# Patient Record
Sex: FEMALE | Race: WHITE | URBAN - METROPOLITAN AREA
[De-identification: names, ages, dates, MRNs, and addresses within clinical notes are randomized per-mention and may not be internally consistent; named-entity substitution may affect disease eponyms.]

---

## 2021-02-26 ENCOUNTER — IMMUNIZATION (OUTPATIENT)
Dept: PRIMARY CARE CLINIC | Age: 66
End: 2021-02-26
Payer: COMMERCIAL

## 2021-02-26 PROCEDURE — 0001A COVID-19, PFIZER VACCINE 30MCG/0.3ML DOSE: CPT | Performed by: FAMILY MEDICINE

## 2021-02-26 PROCEDURE — 91300 COVID-19, PFIZER VACCINE 30MCG/0.3ML DOSE: CPT | Performed by: FAMILY MEDICINE

## 2021-03-19 ENCOUNTER — IMMUNIZATION (OUTPATIENT)
Dept: PRIMARY CARE CLINIC | Age: 66
End: 2021-03-19
Payer: COMMERCIAL

## 2021-03-19 PROCEDURE — 91300 COVID-19, PFIZER VACCINE 30MCG/0.3ML DOSE: CPT | Performed by: FAMILY MEDICINE

## 2021-03-19 PROCEDURE — 0002A PR IMM ADMN SARSCOV2 30MCG/0.3ML DIL RECON 2ND DOSE: CPT | Performed by: FAMILY MEDICINE

## 2022-12-29 RX ORDER — AMLODIPINE BESYLATE 2.5 MG/1
2.5 TABLET ORAL DAILY
COMMUNITY
Start: 2022-10-06

## 2022-12-29 RX ORDER — OMEPRAZOLE 20 MG/1
20 CAPSULE, DELAYED RELEASE ORAL DAILY
COMMUNITY
Start: 2022-10-06

## 2022-12-29 RX ORDER — LISINOPRIL AND HYDROCHLOROTHIAZIDE 20; 12.5 MG/1; MG/1
2 TABLET ORAL DAILY
COMMUNITY
Start: 2022-10-06

## 2022-12-29 RX ORDER — METFORMIN HYDROCHLORIDE 500 MG/1
2000 TABLET, EXTENDED RELEASE ORAL
COMMUNITY
Start: 2022-11-21

## 2022-12-29 RX ORDER — ATORVASTATIN CALCIUM 20 MG/1
20 TABLET, FILM COATED ORAL DAILY
COMMUNITY
Start: 2022-10-06

## 2022-12-29 NOTE — PROGRESS NOTES
[address] [facility] time____0640________        Surgery time______0810______    Take the following medications with a sip of water: Follow your MD/Surgeons pre-procedure instructions regarding your medications     Do not eat or drink anything after 12:00 midnight prior to your surgery. This includes water chewing gum, mints and ice chips. You may brush your teeth and gargle the morning of your surgery, but do not swallow the water     Please see your family doctor/pediatrician for a history and physical and/or concerning medications. H&P1/4/23 Dr. Elie Mendiola any test results/reports from your physicians office. If you are under the care of a heart doctor or specialist doctor, please be aware that you may be asked to them for clearance    You may be asked to stop blood thinners such as Coumadin, Plavix, Fragmin, Lovenox, etc., or any anti-inflammatories such as:  Aspirin, Ibuprofen, Advil, Naproxen prior to your surgery. We also ask that you stop any OTC medications such as fish oil, vitamin E, glucosamine, garlic, Multivitamins, COQ 10, etc.    We ask that you do not smoke 24 hours prior to surgery  We ask that you do not  drink any alcoholic beverages 24 hours prior to surgery     You must make arrangements for a responsible adult to take you home after your surgery. For your safety you will not be allowed to leave alone or drive yourself home. Your surgery will be cancelled if you do not have a ride home. Also for your safety, it is strongly suggested that someone stay with you the first 24 hours after your surgery. A parent or legal guardian must accompany a child scheduled for surgery and plan to stay at the hospital until the child is discharged. Please do not bring other children with you. For your comfort, please wear simple loose fitting clothing to the hospital.  Please do not bring valuables.  Wear short sleeve button down shirt or loose shirt and bring eye drops or eye ointment. Do not wear any make-up or nail polish on your fingers or toes      For your safety, please do not wear any jewelry or body piercing's on the day of surgery. All jewelry must be removed. If you have dentures, they will be removed before going to operating room. For your convenience, we will provide you with a container. If you wear contact lenses or glasses, they will be removed, please bring a case for them. If you have a living will and a durable power of  for healthcare, please bring in a copy. As part of our patient safety program to minimize surgical site infections, we ask you to do the following:    Please notify your surgeon if you develop any illness between         now and the  day of your surgery. This includes a cough, cold, fever, sore throat, nausea,         or vomiting, and diarrhea, etc.   Please notify your surgeon if you experience dizziness, shortness         of breath or blurred vision between now and the time of your surgery. Do not shave your operative site 96 hours prior to surgery. For face and neck surgery, men may use an electric razor 48 hours   prior to surgery. You may shower the night before surgery or the morning of   your surgery with an antibacterial soap. You will need to bring a photo ID and insurance card    [facility] has an onsite pharmacy, would you like to utilize our pharmacy     If you will be staying overnight and use a C-pap machine, please bring   your C-pap to hospital     Our goal is to provide you with excellent care, therefore, visitors will be limited to two(2) in the room at a time so that we may focus on providing this care for you. Please contact pre-admission testing if you have any further questions.                  [facility] phone number:  [phone]  Please note these are generalized instructions for all surgical cases, you may be provided with more specific instructions according to your surgery. C-Difficile admission screening and protocol:       * Admitted with diarrhea? [] YES    [x]  NO     *Prior history of C-Diff. In last 3 months? [] YES    [x]  NO     *Antibiotic use in the past 6-8 weeks? [x]  NO    []  YES                 If yes, which ANTIBIOTIC AND REASON______     *Prior hospitalization or nursing home in the last month? []  YES    [x]  NO        SAFETY FIRST. .call before you fall

## 2023-01-06 ENCOUNTER — ANESTHESIA EVENT (OUTPATIENT)
Dept: SURGERY | Age: 68
End: 2023-01-06
Payer: COMMERCIAL

## 2023-01-06 RX ORDER — SODIUM CHLORIDE 0.9 % (FLUSH) 0.9 %
5-40 SYRINGE (ML) INJECTION EVERY 12 HOURS SCHEDULED
OUTPATIENT
Start: 2023-01-06

## 2023-01-06 RX ORDER — SODIUM CHLORIDE 0.9 % (FLUSH) 0.9 %
5-40 SYRINGE (ML) INJECTION PRN
OUTPATIENT
Start: 2023-01-06

## 2023-01-06 RX ORDER — SODIUM CHLORIDE 9 MG/ML
INJECTION, SOLUTION INTRAVENOUS PRN
OUTPATIENT
Start: 2023-01-06

## 2023-01-06 NOTE — PRE-PROCEDURE INSTRUCTIONS
[address] [facility]  [phone]        Pre-Op Phone Call:     Patient Name: Jennifer Singh     Telephone Information:   Mobile [phone]     Home phone:  [phone]    Surgery Time:    8:40 AM     Arrival Time:  0710     Left extended Message:  NA     Message left with:     Recent change in health status:  No     Advised of transportation/ policy:  Yes     NPO policy reviewed: Yes     Advised to take morning heart/blood pressure medications with sips of water morning of surgery? Yes     Instructed to bring eye drops, photo identification, and insurance card day of surgery? Yes     Advised to wear short sleeved button down shirt (no T-shirt underneath):  yes     Advised not to wear jewelry, hairpins, or pantyhose day of surgery? Yes     Advised not to wear make-up and to wash face day of surgery?   Yes    Remarks:        Electronically signed by:  Brittney Regalado RN at 1/6/2023 9:43 AM

## 2023-01-09 ENCOUNTER — HOSPITAL ENCOUNTER (OUTPATIENT)
Age: 68
Setting detail: OUTPATIENT SURGERY
Discharge: HOME | End: 2023-01-09
Attending: OPHTHALMOLOGY | Admitting: OPHTHALMOLOGY
Payer: COMMERCIAL

## 2023-01-09 ENCOUNTER — ANESTHESIA (OUTPATIENT)
Dept: SURGERY | Age: 68
End: 2023-01-09
Payer: COMMERCIAL

## 2023-01-09 VITALS
OXYGEN SATURATION: 100 % | HEART RATE: 76 BPM | DIASTOLIC BLOOD PRESSURE: 80 MMHG | HEIGHT: 61 IN | SYSTOLIC BLOOD PRESSURE: 149 MMHG | TEMPERATURE: 97.8 F | RESPIRATION RATE: 15 BRPM | BODY MASS INDEX: 26.06 KG/M2 | WEIGHT: 138 LBS

## 2023-01-09 LAB
GLUCOSE BLD-MCNC: 118 MG/DL (ref 70–99)
GLUCOSE BLD-MCNC: 128 MG/DL (ref 70–99)
PERFORMED ON: ABNORMAL
PERFORMED ON: ABNORMAL

## 2023-01-09 PROCEDURE — 6370000000 HC RX 637 (ALT 250 FOR IP): Performed by: OPHTHALMOLOGY

## 2023-01-09 PROCEDURE — 3700000000 HC ANESTHESIA ATTENDED CARE: Performed by: OPHTHALMOLOGY

## 2023-01-09 PROCEDURE — 2720000010 HC SURG SUPPLY STERILE: Performed by: OPHTHALMOLOGY

## 2023-01-09 PROCEDURE — 2580000003 HC RX 258: Performed by: STUDENT IN AN ORGANIZED HEALTH CARE EDUCATION/TRAINING PROGRAM

## 2023-01-09 PROCEDURE — 6360000002 HC RX W HCPCS

## 2023-01-09 PROCEDURE — 2709999900 HC NON-CHARGEABLE SUPPLY: Performed by: OPHTHALMOLOGY

## 2023-01-09 PROCEDURE — 7100000010 HC PHASE II RECOVERY - FIRST 15 MIN: Performed by: OPHTHALMOLOGY

## 2023-01-09 PROCEDURE — 2500000003 HC RX 250 WO HCPCS: Performed by: OPHTHALMOLOGY

## 2023-01-09 PROCEDURE — 3600000004 HC SURGERY LEVEL 4 BASE: Performed by: OPHTHALMOLOGY

## 2023-01-09 PROCEDURE — 3600000014 HC SURGERY LEVEL 4 ADDTL 15MIN: Performed by: OPHTHALMOLOGY

## 2023-01-09 PROCEDURE — 3700000001 HC ADD 15 MINUTES (ANESTHESIA): Performed by: OPHTHALMOLOGY

## 2023-01-09 PROCEDURE — V2632 POST CHMBR INTRAOCULAR LENS: HCPCS | Performed by: OPHTHALMOLOGY

## 2023-01-09 DEVICE — LENS CLAREON IOL 25.0D: Type: IMPLANTABLE DEVICE | Site: EYE | Status: FUNCTIONAL

## 2023-01-09 RX ORDER — BALANCED SALT SOLUTION 6.4; .75; .48; .3; 3.9; 1.7 MG/ML; MG/ML; MG/ML; MG/ML; MG/ML; MG/ML
SOLUTION OPHTHALMIC
Status: COMPLETED | OUTPATIENT
Start: 2023-01-09 | End: 2023-01-09

## 2023-01-09 RX ORDER — SODIUM CHLORIDE 9 MG/ML
INJECTION, SOLUTION INTRAVENOUS PRN
Status: DISCONTINUED | OUTPATIENT
Start: 2023-01-09 | End: 2023-01-09 | Stop reason: SDUPTHER

## 2023-01-09 RX ORDER — TETRACAINE HYDROCHLORIDE 5 MG/ML
1 SOLUTION OPHTHALMIC ONCE
Status: COMPLETED | OUTPATIENT
Start: 2023-01-09 | End: 2023-01-09

## 2023-01-09 RX ORDER — ONDANSETRON 2 MG/ML
4 INJECTION INTRAMUSCULAR; INTRAVENOUS
Status: CANCELLED | OUTPATIENT
Start: 2023-01-09 | End: 2023-01-10

## 2023-01-09 RX ORDER — SODIUM CHLORIDE 9 MG/ML
INJECTION, SOLUTION INTRAVENOUS PRN
Status: DISCONTINUED | OUTPATIENT
Start: 2023-01-09 | End: 2023-01-09 | Stop reason: HOSPADM

## 2023-01-09 RX ORDER — SODIUM CHLORIDE 0.9 % (FLUSH) 0.9 %
5-40 SYRINGE (ML) INJECTION PRN
Status: DISCONTINUED | OUTPATIENT
Start: 2023-01-09 | End: 2023-01-09 | Stop reason: HOSPADM

## 2023-01-09 RX ORDER — DIPHENHYDRAMINE HYDROCHLORIDE 50 MG/ML
12.5 INJECTION INTRAMUSCULAR; INTRAVENOUS
Status: CANCELLED | OUTPATIENT
Start: 2023-01-09 | End: 2023-01-10

## 2023-01-09 RX ORDER — SODIUM CHLORIDE 0.9 % (FLUSH) 0.9 %
5-40 SYRINGE (ML) INJECTION EVERY 12 HOURS SCHEDULED
Status: DISCONTINUED | OUTPATIENT
Start: 2023-01-09 | End: 2023-01-09 | Stop reason: HOSPADM

## 2023-01-09 RX ORDER — SODIUM CHLORIDE 0.9 % (FLUSH) 0.9 %
5-40 SYRINGE (ML) INJECTION PRN
Status: DISCONTINUED | OUTPATIENT
Start: 2023-01-09 | End: 2023-01-09 | Stop reason: SDUPTHER

## 2023-01-09 RX ORDER — CIPROFLOXACIN HYDROCHLORIDE 3.5 MG/ML
1 SOLUTION/ DROPS TOPICAL SEE ADMIN INSTRUCTIONS
Status: DISCONTINUED | OUTPATIENT
Start: 2023-01-09 | End: 2023-01-09 | Stop reason: HOSPADM

## 2023-01-09 RX ORDER — MIDAZOLAM HYDROCHLORIDE 1 MG/ML
INJECTION INTRAMUSCULAR; INTRAVENOUS PRN
Status: DISCONTINUED | OUTPATIENT
Start: 2023-01-09 | End: 2023-01-09 | Stop reason: SDUPTHER

## 2023-01-09 RX ORDER — SODIUM CHLORIDE 0.9 % (FLUSH) 0.9 %
5-40 SYRINGE (ML) INJECTION EVERY 12 HOURS SCHEDULED
Status: CANCELLED | OUTPATIENT
Start: 2023-01-09

## 2023-01-09 RX ORDER — SODIUM CHLORIDE 0.9 % (FLUSH) 0.9 %
5-40 SYRINGE (ML) INJECTION PRN
Status: CANCELLED | OUTPATIENT
Start: 2023-01-09

## 2023-01-09 RX ORDER — SODIUM CHLORIDE 0.9 % (FLUSH) 0.9 %
5-40 SYRINGE (ML) INJECTION EVERY 12 HOURS SCHEDULED
Status: DISCONTINUED | OUTPATIENT
Start: 2023-01-09 | End: 2023-01-09 | Stop reason: SDUPTHER

## 2023-01-09 RX ORDER — TETRACAINE HYDROCHLORIDE 5 MG/ML
SOLUTION OPHTHALMIC
Status: COMPLETED | OUTPATIENT
Start: 2023-01-09 | End: 2023-01-09

## 2023-01-09 RX ORDER — SODIUM CHLORIDE 9 MG/ML
INJECTION, SOLUTION INTRAVENOUS PRN
Status: CANCELLED | OUTPATIENT
Start: 2023-01-09

## 2023-01-09 RX ORDER — BRIMONIDINE TARTRATE 0.15 %
DROPS OPHTHALMIC (EYE)
Status: COMPLETED | OUTPATIENT
Start: 2023-01-09 | End: 2023-01-09

## 2023-01-09 RX ADMIN — CIPROFLOXACIN HYDROCHLORIDE 1 DROP: 3 SOLUTION/ DROPS OPHTHALMIC at 07:25

## 2023-01-09 RX ADMIN — MIDAZOLAM 0.5 MG: 1 INJECTION INTRAMUSCULAR; INTRAVENOUS at 08:58

## 2023-01-09 RX ADMIN — CIPROFLOXACIN HYDROCHLORIDE 1 DROP: 3 SOLUTION/ DROPS OPHTHALMIC at 07:30

## 2023-01-09 RX ADMIN — MIDAZOLAM 1 MG: 1 INJECTION INTRAMUSCULAR; INTRAVENOUS at 08:56

## 2023-01-09 RX ADMIN — TETRACAINE HYDROCHLORIDE 1 DROP: 5 SOLUTION OPHTHALMIC at 07:25

## 2023-01-09 RX ADMIN — POVIDONE-IODINE: 5 SOLUTION OPHTHALMIC at 07:30

## 2023-01-09 RX ADMIN — SODIUM CHLORIDE: 9 INJECTION, SOLUTION INTRAVENOUS at 07:30

## 2023-01-09 ASSESSMENT — PAIN SCALES - WONG BAKER
WONGBAKER_NUMERICALRESPONSE: 0

## 2023-01-09 ASSESSMENT — LIFESTYLE VARIABLES: SMOKING_STATUS: 0

## 2023-01-09 ASSESSMENT — PAIN SCALES - GENERAL
PAINLEVEL_OUTOF10: 0

## 2023-01-09 NOTE — H&P
Date of Surgery Update:  Benja Ngo was seen, history and physical examination reviewed, and patient examined by me today. There have been no significant clinical changes since the completion of the previous history and physical. The surgical site was confirmed by the patient and me. I have presented reasonable alternatives to the patient's proposed care, treatment, and services. The discussion I have done encompassed risks, benefits, and side effects related to the alternatives and the risks related to not receiving the proposed care, treatment, and services. All questions answered. Patient wishes to proceed.      Electronically signed by: Avery Canas MD,1/9/2023,11:45 AM

## 2023-01-09 NOTE — ANESTHESIA POSTPROCEDURE EVALUATION
Department of Anesthesiology  Postprocedure Note    Patient: Mohit King  MRN: [id number]  YOB: 1955  Date of evaluation: 1/9/2023      Procedure Summary     Date: 01/09/23 Room / Location: [facility]    Anesthesia Start: 6138 Anesthesia Stop: 2063    Procedure: PHACOEMULSIFICATION WITH INTRAOCULAR LENS IMPLANT - RIGHT EYE (Right: Eye) Diagnosis:       Nuclear sclerotic cataract of right eye      (Nuclear sclerotic cataract of right eye [)    Surgeons: Jerilyn Ormond, MD Responsible Provider: Ofe Awan MD    Anesthesia Type: MAC ASA Status: 3          Anesthesia Type: No value filed.     Danny Phase I: Danny Score: 10    Danny Phase II: Danny Score: 10      Anesthesia Post Evaluation    Patient location during evaluation: bedside  Patient participation: complete - patient participated  Level of consciousness: awake and alert  Pain score: 0  Nausea & Vomiting: no nausea  Complications: no  Cardiovascular status: hemodynamically stable  Respiratory status: acceptable  Hydration status: stable

## 2023-01-09 NOTE — DISCHARGE INSTRUCTIONS
[facility] [address]   [phone]       Post-Operative Instructions for Day of Cataract Surgery    2023      Patient Name: Jennifer Jimenez  : 1955  MRN: [id number]    Use the Antibiotic Drop; one drop in the [address] more times today. Use the NSAID Drop ;one drop in the OPERATIVE EYE ONE more time today. Use the STEROID Drop: one drop in the OPERATIVE EYE THREE more times today. You may watch TV, walk, and do routine chores. Avoid heavy exertion or straining. Wear shield at bedtime for TWO nights. You may take Tylenol or Advil for mild irritation or pain. If you have pain worse than what Tylenol or Advil can manage, call your physician  If your next day appointment is in the afternoon, then use the Antibiotic, Anti-inflammatory, and Steroid drops once each that morning. Your next appointment is:                                   at:                                 location. Remember to bring your eye medications/kit to the office. General Post-Operative Instructions for Cataract Surgery    COMFORT - Your eye may feel irritated (scratchy, stinging, or achy) this is normal.   DIET - You may resume your regular diet, no alcohol for 24 hours. ACTIVITY - Do not lift anything over 25 pounds today, nothing over 50 pounds for the first week. No driving for 24 hours, Do not make any important decisions or sign legal documents for the first 24 hours. EYE CARE - Use your eye drops as instructed. Wash your hands before using your eye drops. Do not bump, rub, or touch the operative eye. No water in or around your eyes for 24 hours. You may shower from the shoulders down; You may wash your hair 24 hours following surgery. SHIELD - Wear the eye shield for 2 nights following surgery. VISION - You may experience off/on blurry vision today. Your vision will steadily improve over the next days.  Call your surgeon if you experience a drastic decrease in your vision. If your eyelid is closed from the anesthesia, or your eye is patched, your depth perception will be affected. Be careful when walking, especially with steps. When your eyelid opens, you may experience double vision until the anesthetic fully wears off. Bring your eye drops with you to each appointment! Further instructions will be reviewed with you at your follow up appointment. Any questions, please call the office at [phone] or call Dr. Lior Cueto directly at [phone] after hours.

## 2023-01-09 NOTE — OP NOTE
[address] [place] [address]  [phone]      1/9/2023    Patient name: Abelino Cosme  YOB: 1955  MRN: [id number]    Allergies: No Known Allergies    Pre-operative diagnosis:  Mature Cataract, right eye    Post-operative diagnosis:  Same    Procedure Performed:   Phacoemulsification with intraocular lens implant. Malyugin Ring used for pupillary dilation due to small pupil. Anesthesia:  Topical Anesthesia with MAC. Estimated Blood Loss: None    Specimens removed: None    Limbal Relaxing Incisions:  Axis:N/A    Arc Length: N/A    Complications:  None    Description of Procedure: In the same day surgery center, the patient was given the usual pre-operative regimen. In the operating room suite, both eyes prepped and draped in the usual sterile fashion. A paracentesis tract was fashioned, after which 0.5 MI of 1% preservative free Lidocaine was injected into the anterior chamber followed by Viscoat for a complete chamber fill. A clear cornea temporal tunnel was created using a keratome. Under optimal magnification and visualization, a continuous curvilinear capsulorrhexis was performed. Hydro-dissection of the lens was carried out using balanced salt solution. The lens was then phacoemulsified using the divide and conquer technique. Residual cortex was removed using the I/A handpiece followed by thorough posterior capsule polishing. The capsular bag was then filled with Provisc and the lens was gently delivered into the bag, achieving excellent centration. Provisc was removed using the I/A handpiece and the globe was pressurized using balanced salt solution. The paracentesis tract and the temporal wound were both checked and found to be watertight. The speculum was removed and Betadine 5% was instilled. The patient tolerated the procedure well and was taken to the same day surgery suite in stable condition.  Post-operative instructions and follow-up care were arranged.        Electronically signed by: Cherie Charles MD,1/9/2023,11:46 AM

## 2023-01-09 NOTE — ANESTHESIA PRE PROCEDURE
Department of Anesthesiology  Preprocedure Note       Name:  Particia Duty   Age:  75 y.o.  :  1955                                          MRN:  [id number]         Date:  2023      Surgeon: Khushboo Wilde):  Jayant Nur MD    Procedure: Procedure(s):  PHACOEMULSIFICATION WITH INTRAOCULAR LENS IMPLANT - RIGHT EYE    Medications prior to admission:   Prior to Admission medications    Medication Sig Start Date End Date Taking?  Authorizing Provider   atorvastatin (LIPITOR) 20 MG tablet Take 20 mg by mouth daily 10/6/22  Yes Historical Provider, MD   lisinopril-hydroCHLOROthiazide (PRINZIDE;ZESTORETIC) 20-12.5 MG per tablet Take 2 tablets by mouth daily 10/6/22  Yes Historical Provider, MD   amLODIPine (NORVASC) 2.5 MG tablet Take 2.5 mg by mouth daily 10/6/22  Yes Historical Provider, MD   metFORMIN (GLUCOPHAGE-XR) 500 MG extended release tablet Take 2,000 mg by mouth daily (with breakfast) 22  Yes Historical Provider, MD   omeprazole (PRILOSEC) 20 MG delayed release capsule Take 20 mg by mouth daily 10/6/22  Yes Historical Provider, MD       Current medications:    Current Facility-Administered Medications   Medication Dose Route Frequency Provider Last Rate Last Admin    sodium chloride flush 0.9 % injection 5-40 mL  5-40 mL IntraVENous 2 times per day Angélica Hearn MD        sodium chloride flush 0.9 % injection 5-40 mL  5-40 mL IntraVENous PRN Angélica Hearn MD        0.9 % sodium chloride infusion   IntraVENous PRN Angélica Hearn  mL/hr at 23 New Bag at 23    ciprofloxacin (CILOXAN) 0.3 % ophthalmic solution 1 drop  1 drop Right Eye See Admin Instructions Beni Marley MD   1 drop at 23    cyclopentolate 1%, phenylephrine 2.5%, tropicamide 1%, ketorolac 0.5% ophthalmic solution  0.5 mL Right Eye See [address], MD   0.5 mL at 23 07    lidocaine (AKTEN) 3.5 % ophthalmic gel   Right Eye See Admin Instructions Beni Dye MD           Allergies:  No Known Allergies    Problem List:  There is no problem list on file for this patient. Past Medical History:        Diagnosis Date    Anxiety     Chronic hepatitis C without hepatic coma ([address]     treated negative now    Diabetes mellitus (HCC)     GERD (gastroesophageal reflux disease)     Heart murmur     Hyperlipidemia     Hypertension        Past Surgical History:        Procedure Laterality Date    TUBAL LIGATION         Social History:    Social History     Tobacco Use    Smoking status: Never    Smokeless tobacco: Never   Substance Use Topics    Alcohol use: Not Currently                                Counseling given: Not Answered      Vital Signs (Current):   Vitals:    12/29/22 1136 01/09/23 0726   BP:  (!) 157/96   Pulse:  84   Resp:  20   Temp:  97.2 °F (36.2 °C)   TempSrc:  Temporal   SpO2:  99%   Weight: 138 lb (62.6 kg) 138 lb (62.6 kg)   Height: 5' 1\" (1.549 m) 5' 1\" (1.549 m)                                              BP Readings from Last 3 Encounters:   01/09/23 (!) 157/96       NPO Status: Time of last liquid consumption: 2200                        Time of last solid consumption: 2200                        Date of last liquid consumption: 01/08/23                        Date of last solid food consumption: 01/08/23    BMI:   Wt Readings from Last 3 Encounters:   01/09/23 138 lb (62.6 kg)     Body mass index is 26.07 kg/m².     CBC: No results found for: WBC, RBC, HGB, HCT, MCV, RDW, PLT    CMP: No results found for: NA, K, CL, CO2, BUN, CREATININE, GFRAA, AGRATIO, LABGLOM, GLUCOSE, GLU, PROT, CALCIUM, BILITOT, ALKPHOS, AST, ALT    POC Tests:   Recent Labs     01/09/23  0731   POCGLU 128*       Coags: No results found for: PROTIME, INR, APTT    HCG (If Applicable): No results found for: PREGTESTUR, PREGSERUM, HCG, HCGQUANT     ABGs: No results found for: PHART, PO2ART, KXB7COZ, ZUY5QVN, BEART, G1NHWLQF     Type & Screen (If Applicable):  No results found for: LABABO, LABRH    Drug/Infectious Status (If Applicable):  No results found for: HIV, HEPCAB    COVID-19 Screening (If Applicable): No results found for: COVID19        Anesthesia Evaluation  Patient summary reviewed no history of anesthetic complications:   Airway: Mallampati: II  TM distance: >3 FB   Neck ROM: full  Mouth opening: > = 3 FB   Dental:    (+) upper dentures and lower dentures      Pulmonary:       (-) not a current smoker                           Cardiovascular:    (+) hypertension:, hyperlipidemia    (-) past MI, CABG/stent and  angina                Neuro/Psych:      (-) seizures and CVA           GI/Hepatic/Renal:   (+) GERD:,           Endo/Other:    (+) Diabetes, . Abdominal:             Vascular: negative vascular ROS. Other Findings:           Anesthesia Plan      MAC     ASA 3       Induction: intravenous. Anesthetic plan and risks discussed with patient. Plan discussed with CRNA. This pre-anesthesia assessment may be used as a history and physical.    DOS STAFF ADDENDUM:    Pt seen and examined, chart reviewed (including anesthesia, drug and allergy history). No interval changes to history and physical examination. Anesthetic plan, risks, benefits, alternatives, and personnel involved discussed with patient. Patient verbalized an understanding and agrees to proceed.       Ana Ly MD  January 9, 2023  7:50 AM

## 2023-01-12 NOTE — PROGRESS NOTES
[address] [facility] time____0650________        Surgery time__0820__________    Take the following medications with a sip of water: Follow your MD/Surgeons pre-procedure instructions regarding your medications     Do not eat or drink anything after 12:00 midnight prior to your surgery. This includes water chewing gum, mints and ice chips. You may brush your teeth and gargle the morning of your surgery, but do not swallow the water     Please see your family doctor/pediatrician for a history and physical and/or concerning medications. Bring any test results/reports from your physicians office. If you are under the care of a heart doctor or specialist doctor, please be aware that you may be asked to them for clearance    You may be asked to stop blood thinners such as Coumadin, Plavix, Fragmin, Lovenox, etc., or any anti-inflammatories such as:  Aspirin, Ibuprofen, Advil, Naproxen prior to your surgery. We also ask that you stop any OTC medications such as fish oil, vitamin E, glucosamine, garlic, Multivitamins, COQ 10, etc.    We ask that you do not smoke 24 hours prior to surgery  We ask that you do not  drink any alcoholic beverages 24 hours prior to surgery     You must make arrangements for a responsible adult to take you home after your surgery. For your safety you will not be allowed to leave alone or drive yourself home. Your surgery will be cancelled if you do not have a ride home. Also for your safety, it is strongly suggested that someone stay with you the first 24 hours after your surgery. A parent or legal guardian must accompany a child scheduled for surgery and plan to stay at the hospital until the child is discharged. Please do not bring other children with you. For your comfort, please wear simple loose fitting clothing to the hospital.  Please do not bring valuables.     Do not wear any make-up or nail polish on your fingers or toes      For your safety, please do not wear any jewelry or body piercing's on the day of surgery. All jewelry must be removed. If you have dentures, they will be removed before going to operating room. For your convenience, we will provide you with a container. If you wear contact lenses or glasses, they will be removed, please bring a case for them. If you have a living will and a durable power of  for healthcare, please bring in a copy. As part of our patient safety program to minimize surgical site infections, we ask you to do the following:    Please notify your surgeon if you develop any illness between         now and the  day of your surgery. This includes a cough, cold, fever, sore throat, nausea,         or vomiting, and diarrhea, etc.   Please notify your surgeon if you experience dizziness, shortness         of breath or blurred vision between now and the time of your surgery. Do not shave your operative site 96 hours prior to surgery. For face and neck surgery, men may use an electric razor 48 hours   prior to surgery. You may shower the night before surgery or the morning of   your surgery with an antibacterial soap. You will need to bring a photo ID and insurance card    [facility] has an onsite pharmacy, would you like to utilize our pharmacy     If you will be staying overnight and use a C-pap machine, please bring   your C-pap to hospital     Our goal is to provide you with excellent care, therefore, visitors will be limited to two(2) in the room at a time so that we may focus on providing this care for you. Please contact pre-admission testing if you have any further questions. [facility] phone number:  [phone]     [facility] fax number:  [phone]  Please note these are generalized instructions for all surgical cases, you may be provided with more specific instructions according to your surgery.     C-Difficile admission screening and protocol:       * Admitted with diarrhea? [] YES    [x]  NO     *Prior history of C-Diff. In last 3 months? [] YES    [x]  NO     *Antibiotic use in the past 6-8 weeks? [x]  NO    []  YES                 If yes, which ANTIBIOTIC AND REASON______     *Prior hospitalization or nursing home in the last month? []  YES    []  NO        SAFETY FIRST. .call before you fall

## 2023-01-20 NOTE — PRE-PROCEDURE INSTRUCTIONS
[address]        Pre-Op Phone Call:     Patient Name: Ashanti Cortes     Telephone Information:   Mobile [phone]     Home phone:  [phone]    Surgery Time:    8:40 AM     Arrival Time:  0725     Left extended Message:  No     Message left with: spoke with pt     Recent change in health status:  No     Advised of transportation/ policy:  Yes     NPO policy reviewed: Yes     Advised to take morning heart/blood pressure medications with sips of water morning of surgery? Yes     Instructed to bring eye drops, photo identification, and insurance card day of surgery? Yes     Advised to wear short sleeved button down shirt (no T-shirt underneath):  Yes     Advised not to wear jewelry, hairpins, or pantyhose day of surgery? Yes     Advised not to wear make-up and to wash face day of surgery?   Yes    Remarks: spoke with patient regarding preop instructions, pt states understanding with no questions at this time        Electronically signed by:  Jon Kurtz RN at 1/20/2023 10:18 AM

## 2023-01-23 ENCOUNTER — HOSPITAL ENCOUNTER (OUTPATIENT)
Age: 68
Setting detail: OUTPATIENT SURGERY
Discharge: HOME | End: 2023-01-23
Attending: OPHTHALMOLOGY | Admitting: OPHTHALMOLOGY
Payer: COMMERCIAL

## 2023-01-23 ENCOUNTER — ANESTHESIA (OUTPATIENT)
Dept: SURGERY | Age: 68
End: 2023-01-23
Payer: COMMERCIAL

## 2023-01-23 ENCOUNTER — ANESTHESIA EVENT (OUTPATIENT)
Dept: SURGERY | Age: 68
End: 2023-01-23
Payer: COMMERCIAL

## 2023-01-23 VITALS
DIASTOLIC BLOOD PRESSURE: 73 MMHG | BODY MASS INDEX: 26.06 KG/M2 | WEIGHT: 138 LBS | OXYGEN SATURATION: 100 % | HEIGHT: 61 IN | HEART RATE: 78 BPM | RESPIRATION RATE: 17 BRPM | TEMPERATURE: 96.7 F | SYSTOLIC BLOOD PRESSURE: 126 MMHG

## 2023-01-23 LAB
GLUCOSE BLD-MCNC: 128 MG/DL (ref 70–99)
GLUCOSE BLD-MCNC: 131 MG/DL (ref 70–99)
PERFORMED ON: ABNORMAL
PERFORMED ON: ABNORMAL

## 2023-01-23 PROCEDURE — 3600000014 HC SURGERY LEVEL 4 ADDTL 15MIN: Performed by: OPHTHALMOLOGY

## 2023-01-23 PROCEDURE — 2709999900 HC NON-CHARGEABLE SUPPLY: Performed by: OPHTHALMOLOGY

## 2023-01-23 PROCEDURE — 2720000010 HC SURG SUPPLY STERILE: Performed by: OPHTHALMOLOGY

## 2023-01-23 PROCEDURE — 7100000010 HC PHASE II RECOVERY - FIRST 15 MIN: Performed by: OPHTHALMOLOGY

## 2023-01-23 PROCEDURE — 3600000004 HC SURGERY LEVEL 4 BASE: Performed by: OPHTHALMOLOGY

## 2023-01-23 PROCEDURE — 2500000003 HC RX 250 WO HCPCS: Performed by: OPHTHALMOLOGY

## 2023-01-23 PROCEDURE — 3700000001 HC ADD 15 MINUTES (ANESTHESIA): Performed by: OPHTHALMOLOGY

## 2023-01-23 PROCEDURE — 6360000002 HC RX W HCPCS

## 2023-01-23 PROCEDURE — V2632 POST CHMBR INTRAOCULAR LENS: HCPCS | Performed by: OPHTHALMOLOGY

## 2023-01-23 PROCEDURE — 6370000000 HC RX 637 (ALT 250 FOR IP): Performed by: OPHTHALMOLOGY

## 2023-01-23 PROCEDURE — 2580000003 HC RX 258: Performed by: STUDENT IN AN ORGANIZED HEALTH CARE EDUCATION/TRAINING PROGRAM

## 2023-01-23 PROCEDURE — 3700000000 HC ANESTHESIA ATTENDED CARE: Performed by: OPHTHALMOLOGY

## 2023-01-23 DEVICE — LENS CLAREON IOL 26.0D: Type: IMPLANTABLE DEVICE | Site: EYE | Status: FUNCTIONAL

## 2023-01-23 RX ORDER — SODIUM CHLORIDE 0.9 % (FLUSH) 0.9 %
5-40 SYRINGE (ML) INJECTION EVERY 12 HOURS SCHEDULED
Status: DISCONTINUED | OUTPATIENT
Start: 2023-01-23 | End: 2023-01-23 | Stop reason: HOSPADM

## 2023-01-23 RX ORDER — CIPROFLOXACIN HYDROCHLORIDE 3.5 MG/ML
1 SOLUTION/ DROPS TOPICAL SEE ADMIN INSTRUCTIONS
Status: DISCONTINUED | OUTPATIENT
Start: 2023-01-23 | End: 2023-01-23 | Stop reason: HOSPADM

## 2023-01-23 RX ORDER — TROPICAMIDE 10 MG/ML
1 SOLUTION/ DROPS OPHTHALMIC SEE ADMIN INSTRUCTIONS
Status: DISCONTINUED | OUTPATIENT
Start: 2023-01-23 | End: 2023-01-23 | Stop reason: HOSPADM

## 2023-01-23 RX ORDER — CYCLOPENTOLATE HYDROCHLORIDE 10 MG/ML
1 SOLUTION/ DROPS OPHTHALMIC SEE ADMIN INSTRUCTIONS
Status: DISCONTINUED | OUTPATIENT
Start: 2023-01-23 | End: 2023-01-23 | Stop reason: HOSPADM

## 2023-01-23 RX ORDER — SODIUM CHLORIDE 0.9 % (FLUSH) 0.9 %
5-40 SYRINGE (ML) INJECTION EVERY 12 HOURS SCHEDULED
Status: DISCONTINUED | OUTPATIENT
Start: 2023-01-23 | End: 2023-01-23 | Stop reason: SDUPTHER

## 2023-01-23 RX ORDER — TETRACAINE HYDROCHLORIDE 5 MG/ML
SOLUTION OPHTHALMIC
Status: COMPLETED | OUTPATIENT
Start: 2023-01-23 | End: 2023-01-23

## 2023-01-23 RX ORDER — KETOROLAC TROMETHAMINE 5 MG/ML
1 SOLUTION OPHTHALMIC SEE ADMIN INSTRUCTIONS
Status: DISCONTINUED | OUTPATIENT
Start: 2023-01-23 | End: 2023-01-23 | Stop reason: HOSPADM

## 2023-01-23 RX ORDER — SODIUM CHLORIDE 0.9 % (FLUSH) 0.9 %
5-40 SYRINGE (ML) INJECTION PRN
Status: DISCONTINUED | OUTPATIENT
Start: 2023-01-23 | End: 2023-01-23 | Stop reason: HOSPADM

## 2023-01-23 RX ORDER — BALANCED SALT SOLUTION 6.4; .75; .48; .3; 3.9; 1.7 MG/ML; MG/ML; MG/ML; MG/ML; MG/ML; MG/ML
SOLUTION OPHTHALMIC
Status: COMPLETED | OUTPATIENT
Start: 2023-01-23 | End: 2023-01-23

## 2023-01-23 RX ORDER — SODIUM CHLORIDE 9 MG/ML
INJECTION, SOLUTION INTRAVENOUS PRN
Status: DISCONTINUED | OUTPATIENT
Start: 2023-01-23 | End: 2023-01-23 | Stop reason: HOSPADM

## 2023-01-23 RX ORDER — SODIUM CHLORIDE 0.9 % (FLUSH) 0.9 %
5-40 SYRINGE (ML) INJECTION PRN
Status: DISCONTINUED | OUTPATIENT
Start: 2023-01-23 | End: 2023-01-23 | Stop reason: SDUPTHER

## 2023-01-23 RX ORDER — SODIUM CHLORIDE 9 MG/ML
INJECTION, SOLUTION INTRAVENOUS PRN
Status: DISCONTINUED | OUTPATIENT
Start: 2023-01-23 | End: 2023-01-23 | Stop reason: SDUPTHER

## 2023-01-23 RX ORDER — ONDANSETRON 2 MG/ML
4 INJECTION INTRAMUSCULAR; INTRAVENOUS
Status: CANCELLED | OUTPATIENT
Start: 2023-01-23 | End: 2023-01-24

## 2023-01-23 RX ORDER — BRIMONIDINE TARTRATE 0.15 %
DROPS OPHTHALMIC (EYE)
Status: COMPLETED | OUTPATIENT
Start: 2023-01-23 | End: 2023-01-23

## 2023-01-23 RX ORDER — TETRACAINE HYDROCHLORIDE 5 MG/ML
1 SOLUTION OPHTHALMIC ONCE
Status: COMPLETED | OUTPATIENT
Start: 2023-01-23 | End: 2023-01-23

## 2023-01-23 RX ORDER — MIDAZOLAM HYDROCHLORIDE 1 MG/ML
INJECTION INTRAMUSCULAR; INTRAVENOUS PRN
Status: DISCONTINUED | OUTPATIENT
Start: 2023-01-23 | End: 2023-01-23 | Stop reason: SDUPTHER

## 2023-01-23 RX ORDER — SODIUM CHLORIDE 0.9 % (FLUSH) 0.9 %
5-40 SYRINGE (ML) INJECTION PRN
Status: CANCELLED | OUTPATIENT
Start: 2023-01-23

## 2023-01-23 RX ORDER — PHENYLEPHRINE HCL 2.5 %
1 DROPS OPHTHALMIC (EYE) SEE ADMIN INSTRUCTIONS
Status: DISCONTINUED | OUTPATIENT
Start: 2023-01-23 | End: 2023-01-23 | Stop reason: HOSPADM

## 2023-01-23 RX ORDER — SODIUM CHLORIDE 9 MG/ML
INJECTION, SOLUTION INTRAVENOUS PRN
Status: CANCELLED | OUTPATIENT
Start: 2023-01-23

## 2023-01-23 RX ORDER — SODIUM CHLORIDE 0.9 % (FLUSH) 0.9 %
5-40 SYRINGE (ML) INJECTION EVERY 12 HOURS SCHEDULED
Status: CANCELLED | OUTPATIENT
Start: 2023-01-23

## 2023-01-23 RX ADMIN — PHENYLEPHRINE HYDROCHLORIDE 1 DROP: 25 SOLUTION/ DROPS OPHTHALMIC at 07:45

## 2023-01-23 RX ADMIN — CYCLOPENTOLATE HYDROCHLORIDE 1 DROP: 10 SOLUTION OPHTHALMIC at 07:45

## 2023-01-23 RX ADMIN — CIPROFLOXACIN 1 DROP: 3 SOLUTION OPHTHALMIC at 07:45

## 2023-01-23 RX ADMIN — TROPICAMIDE 1 DROP: 10 SOLUTION/ DROPS OPHTHALMIC at 07:40

## 2023-01-23 RX ADMIN — KETOROLAC TROMETHAMINE 1 DROP: 0.5 SOLUTION OPHTHALMIC at 07:45

## 2023-01-23 RX ADMIN — SODIUM CHLORIDE: 9 INJECTION, SOLUTION INTRAVENOUS at 07:51

## 2023-01-23 RX ADMIN — MIDAZOLAM 1 MG: 1 INJECTION INTRAMUSCULAR; INTRAVENOUS at 08:39

## 2023-01-23 RX ADMIN — KETOROLAC TROMETHAMINE 1 DROP: 0.5 SOLUTION OPHTHALMIC at 07:40

## 2023-01-23 RX ADMIN — PHENYLEPHRINE HYDROCHLORIDE 1 DROP: 25 SOLUTION/ DROPS OPHTHALMIC at 07:40

## 2023-01-23 RX ADMIN — TROPICAMIDE 1 DROP: 10 SOLUTION/ DROPS OPHTHALMIC at 07:45

## 2023-01-23 RX ADMIN — CYCLOPENTOLATE HYDROCHLORIDE 1 DROP: 10 SOLUTION OPHTHALMIC at 07:40

## 2023-01-23 RX ADMIN — TETRACAINE HYDROCHLORIDE 1 DROP: 5 SOLUTION OPHTHALMIC at 07:40

## 2023-01-23 RX ADMIN — MIDAZOLAM 1 MG: 1 INJECTION INTRAMUSCULAR; INTRAVENOUS at 08:36

## 2023-01-23 RX ADMIN — CIPROFLOXACIN 1 DROP: 3 SOLUTION OPHTHALMIC at 07:40

## 2023-01-23 ASSESSMENT — PAIN - FUNCTIONAL ASSESSMENT: PAIN_FUNCTIONAL_ASSESSMENT: 0-10

## 2023-01-23 ASSESSMENT — PAIN SCALES - GENERAL
PAINLEVEL_OUTOF10: 0
PAINLEVEL_OUTOF10: 0

## 2023-01-23 ASSESSMENT — LIFESTYLE VARIABLES: SMOKING_STATUS: 0

## 2023-01-23 NOTE — ANESTHESIA POSTPROCEDURE EVALUATION
Department of Anesthesiology  Postprocedure Note    Patient: Dheeraj Oakley  MRN: [id number]  YOB: 1955  Date of evaluation: 1/23/2023      Procedure Summary     Date: 01/23/23 Room / Location: [address]    Anesthesia Start: 1381 Anesthesia Stop: 6567    Procedures:       PHACOEMULSIFICATION WITH INTRAOCULAR LENS IMPLANT - LEFT EYE (Left: Eye)      LIMBAL RELAXING INCISION - LEFT EYE (Left: Eye) Diagnosis:       Nuclear sclerotic cataract of left eye      Astigmatism of left eye, unspecified type      (Nuclear sclerotic cataract of left eye,Astigmatism of left eye)    Surgeons: Marcello Watts MD Responsible Provider: Adriano Ceballos MD    Anesthesia Type: MAC ASA Status: 3          Anesthesia Type: No value filed.     Danny Phase I: Danny Score: 10    Danny Phase II: Danny Score: 10      Anesthesia Post Evaluation    Patient location during evaluation: PACU  Patient participation: complete - patient participated  Level of consciousness: awake and alert  Airway patency: patent  Nausea & Vomiting: no nausea and no vomiting  Complications: no  Cardiovascular status: hemodynamically stable  Respiratory status: acceptable  Hydration status: stable

## 2023-01-23 NOTE — H&P
Date of Surgery Update:  George Jauregui was seen, history and physical examination reviewed, and patient examined by me today. There have been no significant clinical changes since the completion of the previous history and physical. The surgical site was confirmed by the patient and me. I have presented reasonable alternatives to the patient's proposed care, treatment, and services. The discussion I have done encompassed risks, benefits, and side effects related to the alternatives and the risks related to not receiving the proposed care, treatment, and services. All questions answered. Patient wishes to proceed.      Electronically signed by: Angelia Rocha MD,1/23/2023,9:10 AM

## 2023-01-23 NOTE — ANESTHESIA PRE PROCEDURE
Department of Anesthesiology  Preprocedure Note       Name:  Shahnaz Raimrez   Age:  75 y.o.  :  1955                                          MRN:  [id number]         Date:  2023      Surgeon: Susan Snyder):  Briana Lu MD    Procedure: Procedure(s):  PHACOEMULSIFICATION WITH INTRAOCULAR LENS IMPLANT - LEFT EYE  LIMBAL RELAXING INCISION - LEFT EYE    Medications prior to admission:   Prior to Admission medications    Medication Sig Start Date End Date Taking?  Authorizing Provider   atorvastatin (LIPITOR) 20 MG tablet Take 20 mg by mouth daily 10/6/22   Historical Provider, MD   lisinopril-hydroCHLOROthiazide (PRINZIDE;ZESTORETIC) 20-12.5 MG per tablet Take 2 tablets by mouth daily 10/6/22   Historical Provider, MD   amLODIPine (NORVASC) 2.5 MG tablet Take 2.5 mg by mouth daily 10/6/22   Historical Provider, MD   metFORMIN (GLUCOPHAGE-XR) 500 MG extended release tablet Take 2,000 mg by mouth daily (with breakfast) 22   Historical Provider, MD   omeprazole (PRILOSEC) 20 MG delayed release capsule Take 20 mg by mouth daily 10/6/22   Historical Provider, MD       Current medications:    Current Facility-Administered Medications   Medication Dose Route Frequency Provider Last Rate Last Admin    ciprofloxacin (CILOXAN) 0.3 % ophthalmic solution 1 drop  1 drop Left Eye See Admin Instructions Beni Mehta MD   1 drop at 23 0745    lidocaine (AKTEN) 3.5 % ophthalmic gel   Left Eye See [address], MD        sodium chloride flush 0.9 % injection 5-40 mL  5-40 mL IntraVENous 2 times per day Gilda Waller MD        sodium chloride flush 0.9 % injection 5-40 mL  5-40 mL IntraVENous PRN Gilda Waller MD        0.9 % sodium chloride infusion   IntraVENous PRN Gilda Waller MD 25 mL/hr at 23 0751 New Bag at 23 0751    cyclopentolate (CYCLOGYL) 1 % ophthalmic solution 1 drop  1 drop Left Eye See [address] Salina Mehta MD   1 drop at 01/23/23 0745    phenylephrine (MYDFRIN) 2.5 % ophthalmic solution 1 drop  1 drop Left Eye See Admin Instructions Beni Nicolas MD   1 drop at 01/23/23 0745    tropicamide (MYDRIACYL) 1 % ophthalmic solution 1 drop  1 drop Left Eye See Admin Instructions Beni Nicolas MD   1 drop at 01/23/23 0745    ketorolac (ACULAR) 0.5 % ophthalmic solution 1 drop  1 drop Left Eye See Admin Instructions Beni Nicolas MD   1 drop at 01/23/23 0745       Allergies:  No Known Allergies    Problem List:  There is no problem list on file for this patient.       Past Medical History:        Diagnosis Date    Anxiety     Chronic hepatitis C without hepatic coma ([facility] [address]     treated negative now    Diabetes mellitus ([facility] [address]     GERD (gastroesophageal reflux disease)     Heart murmur     Hyperlipidemia     Hypertension        Past Surgical History:        Procedure Laterality Date    INTRACAPSULAR CATARACT EXTRACTION Right 1/9/2023    PHACOEMULSIFICATION WITH INTRAOCULAR LENS IMPLANT - RIGHT EYE performed by Kimberlyn Mercado MD at [address]         Social History:    Social History     Tobacco Use    Smoking status: Never    Smokeless tobacco: Never   Substance Use Topics    Alcohol use: Not Currently                                Counseling given: Not Answered      Vital Signs (Current):   Vitals:    01/12/23 1219 01/23/23 0741   BP:  (!) 140/69   Pulse:  80   Resp:  15   Temp:  97.4 °F (36.3 °C)   TempSrc:  Temporal   SpO2:  100%   Weight: 138 lb (62.6 kg) 138 lb (62.6 kg)   Height: 5' 1\" (1.549 m) 5' 1\" (1.549 m)                                              BP Readings from Last 3 Encounters:   01/23/23 (!) 140/69   01/09/23 (!) 149/80       NPO Status: Time of last liquid consumption: 2100                        Time of last solid consumption: 2100                        Date of last liquid consumption: 01/22/23                        Date of last solid food consumption: 01/22/23    BMI:   Wt Readings from Last 3 Encounters:   01/23/23 138 lb (62.6 kg)   01/09/23 138 lb (62.6 kg)     Body mass index is 26.07 kg/m². CBC: No results found for: WBC, RBC, HGB, HCT, MCV, RDW, PLT    CMP: No results found for: NA, K, CL, CO2, BUN, CREATININE, GFRAA, AGRATIO, LABGLOM, GLUCOSE, GLU, PROT, CALCIUM, BILITOT, ALKPHOS, AST, ALT    POC Tests:   Recent Labs     01/23/23  0750   POCGLU 131*       Coags: No results found for: PROTIME, INR, APTT    HCG (If Applicable): No results found for: PREGTESTUR, PREGSERUM, HCG, HCGQUANT     ABGs: No results found for: PHART, PO2ART, MXL4CFW, RME1QVA, BEART, R4LQATTZ     Type & Screen (If Applicable):  No results found for: LABABO, LABRH    Drug/Infectious Status (If Applicable):  No results found for: HIV, HEPCAB    COVID-19 Screening (If Applicable): No results found for: COVID19        Anesthesia Evaluation  Patient summary reviewed no history of anesthetic complications:   Airway: Mallampati: II  TM distance: >3 FB   Neck ROM: full  Mouth opening: > = 3 FB   Dental:    (+) upper dentures and lower dentures      Pulmonary: breath sounds clear to auscultation      (-) not a current smoker                           Cardiovascular:    (+) hypertension:, hyperlipidemia    (-) past MI, CABG/stent and  angina      Rhythm: regular  Rate: normal                    Neuro/Psych:   (+) depression/anxiety    (-) seizures and CVA           GI/Hepatic/Renal:   (+) GERD:, hepatitis (s/p treatment): C, liver disease:,           Endo/Other:    (+) Diabetes, . Abdominal:             Vascular: negative vascular ROS. Other Findings:             Anesthesia Plan      MAC     ASA 3       Induction: intravenous. Anesthetic plan and risks discussed with patient. Plan discussed with CRNA.                   This pre-anesthesia assessment may be used as a history and physical.    DOS STAFF ADDENDUM:    Pt seen and examined, chart reviewed (including anesthesia, drug and allergy history). No interval changes to history and physical examination. Anesthetic plan, risks, benefits, alternatives, and personnel involved discussed with patient. Patient verbalized an understanding and agrees to proceed.       Rebecca Shah MD  January 23, 2023  8:08 AM

## 2023-01-23 NOTE — OP NOTE
[address] [address]  [phone]      1/23/2023    Patient name: Tanya Mix  YOB: 1955  MRN: [id number]    Allergies: No Known Allergies    Pre-operative diagnosis:  Mature Cataract, left eye    Post-operative diagnosis:  Same    Procedure Performed:    Phacoemulsification with intraocular lens implant. Malyugin Ring used for pupillary dilation due to small pupil. Anesthesia:  Topical Anesthesia with MAC. Estimated Blood Loss: None    Specimens removed: None    Limbal Relaxing Incisions:  Axis:N/A    Arc Length: N/A    Complications:  None    Description of Procedure: In the same day surgery center, the patient was given the usual pre-operative regimen. In the operating room suite, both eyes prepped and draped in the usual sterile fashion. A paracentesis tract was fashioned, after which 0.5 MI of 1% preservative free Lidocaine was injected into the anterior chamber followed by Viscoat for a complete chamber fill. A clear cornea temporal tunnel was created using a keratome. Under optimal magnification and visualization, a continuous curvilinear capsulorrhexis was performed. Hydro-dissection of the lens was carried out using balanced salt solution. The lens was then phacoemulsified using the divide and conquer technique. Residual cortex was removed using the I/A handpiece followed by thorough posterior capsule polishing. The capsular bag was then filled with Provisc and the lens was gently delivered into the bag, achieving excellent centration. Provisc was removed using the I/A handpiece and the globe was pressurized using balanced salt solution. The paracentesis tract and the temporal wound were both checked and found to be watertight. The speculum was removed and Betadine 5% was instilled. The patient tolerated the procedure well and was taken to the same day surgery suite in stable condition.  Post-operative instructions and follow-up care were arranged.        Electronically signed by: Rosalva Mejia MD,1/23/2023,9:10 AM

## 2023-01-23 NOTE — DISCHARGE INSTRUCTIONS
Post-Operative Instructions After Cataract Surgery  [facility]   Donnise Opitz, M.D.    [phone]    {left/right:[id number]}    @ED@    Patient Name: Katarina Husain  : 1955  MRN: [id number]      Wear your protective shield at bedtime and nap time for 1 week to prevent accidentally rubbing or bumping your eye. The post-operative drops are VERY IMPORTANT. Use them as directed on the drop schedule given to you the day of surgery. Do not lift over 25 lbs for the first week. DO NOT RUB YOUR EYE. You may wash your hair 24 hours after surgery, but avoid getting water in your eye for the first 5 days. Use a dry wash cloth to protect water from getting in your eye while taking a shower. No eye makeup for 1 week. You may return to work anytime provided your job does not require heavy lifting or straining. If you work in a karen environment, please take one week off work after surgery. CALL THE OFFICE IMMEDIATELY IF YOU EXPERIENCE ANY OF THE FOLLOWING                   [phone]  Veil or curtain coming across vision. Sudden decrease in vision. Shower of NEW floaters. Increase in pain. Flashes of light.

## (undated) DEVICE — SYRINGE MED 3ML CLR PLAS STD N CTRL LUERLOCK TIP DISP

## (undated) DEVICE — Z DISCONTINUED USE 2131664 WIPE INSTR W3XL3IN NONLINTING

## (undated) DEVICE — SYRINGE TB 1ML NDL 25GA L0.625IN PLAS SLIP TIP CONVENTIONAL

## (undated) DEVICE — SURGICAL PROCEDURE PACK PIK PPK374205] ALCON LABORATORIES INC]

## (undated) DEVICE — SOLUTION IRRIG BSS ST 500ML

## (undated) DEVICE — SYRINGE MED 30ML STD CLR PLAS LUERLOCK TIP N CTRL DISP

## (undated) DEVICE — SURGICAL PROC PACK SHT WEST V4

## (undated) DEVICE — SCALPEL OPHTH GRV MIC 0.55 MM STR W/ HNDL SS ALUM STRL

## (undated) DEVICE — SOLUTION IRRIG 500ML STRL H2O NONPYROGENIC

## (undated) DEVICE — Device: Brand: MEDEX

## (undated) DEVICE — GLOVE SURG SZ 85 CRM LTX FREE POLYISOPRENE POLYMER BEAD ANTI

## (undated) DEVICE — MARKER,SKIN,WI/RULER AND LABELS: Brand: MEDLINE

## (undated) DEVICE — Device: Brand: MALYUGIN RING SYSTEM 2.0, 6.25MM

## (undated) DEVICE — Device: Brand: MALYUGIN RING SYSTEM 6.25MM